# Patient Record
Sex: MALE | Race: WHITE | NOT HISPANIC OR LATINO
[De-identification: names, ages, dates, MRNs, and addresses within clinical notes are randomized per-mention and may not be internally consistent; named-entity substitution may affect disease eponyms.]

---

## 2020-02-27 PROBLEM — Z00.00 ENCOUNTER FOR PREVENTIVE HEALTH EXAMINATION: Status: ACTIVE | Noted: 2020-02-27

## 2020-03-06 ENCOUNTER — LABORATORY RESULT (OUTPATIENT)
Age: 35
End: 2020-03-06

## 2020-03-06 ENCOUNTER — APPOINTMENT (OUTPATIENT)
Dept: ENDOCRINOLOGY | Facility: CLINIC | Age: 35
End: 2020-03-06
Payer: COMMERCIAL

## 2020-03-06 VITALS
WEIGHT: 162 LBS | BODY MASS INDEX: 23.19 KG/M2 | HEIGHT: 70 IN | SYSTOLIC BLOOD PRESSURE: 98 MMHG | DIASTOLIC BLOOD PRESSURE: 62 MMHG

## 2020-03-06 DIAGNOSIS — F17.210 NICOTINE DEPENDENCE, CIGARETTES, UNCOMPLICATED: ICD-10-CM

## 2020-03-06 LAB — GLUCOSE BLDC GLUCOMTR-MCNC: 131

## 2020-03-06 PROCEDURE — 99214 OFFICE O/P EST MOD 30 MIN: CPT | Mod: 25

## 2020-03-06 PROCEDURE — 82962 GLUCOSE BLOOD TEST: CPT

## 2020-03-06 RX ORDER — BLOOD SUGAR DIAGNOSTIC
STRIP MISCELLANEOUS
Qty: 4 | Refills: 4 | Status: DISCONTINUED | COMMUNITY
Start: 2020-03-06 | End: 2020-03-06

## 2020-03-06 NOTE — PHYSICAL EXAM
[Alert] : alert [No Acute Distress] : no acute distress [Well Nourished] : well nourished [Well Developed] : well developed [Normal Sclera/Conjunctiva] : normal sclera/conjunctiva [EOMI] : extra ocular movement intact [Supple] : the neck was supple [No LAD] : no lymphadenopathy [Thyroid Not Enlarged] : the thyroid was not enlarged [Normal Rate and Effort] : normal respiratory rhythm and effort [No Accessory Muscle Use] : no accessory muscle use [Clear to Auscultation] : lungs were clear to auscultation bilaterally [Normal Rate] : heart rate was normal  [Normal S1, S2] : normal S1 and S2 [Regular Rhythm] : with a regular rhythm [Pedal Pulses Normal] : the pedal pulses are present [No Edema] : there was no peripheral edema [Normal Bowel Sounds] : normal bowel sounds [Not Tender] : non-tender [Soft] : abdomen soft [Normal Gait] : normal gait [No Rash] : no rash [Right Foot Was Examined] : right foot ~C was examined [Left Foot Was Examined] : left foot ~C was examined [Normal] : normal [Full ROM] : with full range of motion [No Motor Deficits] : the motor exam was normal [No Tremors] : no tremors [Oriented x3] : oriented to person, place, and time [Normal Insight/Judgement] : insight and judgment were intact [Normal Mood] : the mood was normal [Foot Ulcers] : no foot ulcers [Acanthosis Nigricans] : no acanthosis nigricans [Diminished Throughout Both Feet] : normal tactile sensation with monofilament testing throughout both feet

## 2020-03-06 NOTE — HISTORY OF PRESENT ILLNESS
[FreeTextEntry1] : Last visit was in 6/2017\par Current Smoker\par \par Quality: Type 1 DM \par Severity: moderate \par Duration: since the age of 7 \par Onset: polyuria, polydipsia \par Modifying Factors: Better with insulin \par Associated Symptoms: retinopathy, neuropathy \par \par Current Regimen:\par Humalog 15 units before meals\par Lantus 32 units at HS\par \par \par Self blood sugar monitoring: none\par \par  \par exercise: active at work - midnight shifts \par \par Diet: balanced \par \par Weight: possible weight loss \par \par Date of last eye exam: 2 months ago (+) DR \par Date of last foot exam: none\par Date of last flu vaccine: no\par Date of last Pneumovax: no

## 2020-03-06 NOTE — REVIEW OF SYSTEMS
[Fatigue] : fatigue [Recent Weight Loss (___ Lbs)] : recent [unfilled] ~Ulb weight loss [Visual Field Defect] : visual field defect [Blurry Vision] : blurred vision [Headache] : headaches [Polydipsia] : polydipsia [Decreased Appetite] : appetite not decreased [Recent Weight Gain (___ Lbs)] : no recent weight gain [Dysphagia] : no dysphagia [Dysphonia] : no dysphonia [Neck Pain] : no neck pain [Chest Pain] : no chest pain [Palpitations] : no palpitations [Constipation] : no constipation [Diarrhea] : no diarrhea [Polyuria] : no polyuria [Dysuria] : no dysuria [Tremors] : no tremors [Depression] : no depression [Anxiety] : no anxiety [Cold Intolerance] : cold tolerant [Heat Intolerance] : heat tolerant [Easy Bruising] : no tendency for easy bruising [Swelling] : no swelling [de-identified] : r/t stress and lack of sleep  [de-identified] : with high blood sugars

## 2020-03-13 LAB
25(OH)D3 SERPL-MCNC: 29.1 NG/ML
ALBUMIN SERPL ELPH-MCNC: 5.1 G/DL
ALP BLD-CCNC: 138 U/L
ALT SERPL-CCNC: 22 U/L
ANION GAP SERPL CALC-SCNC: 14 MMOL/L
AST SERPL-CCNC: 20 U/L
BASOPHILS # BLD AUTO: 0.05 K/UL
BASOPHILS NFR BLD AUTO: 0.7 %
BILIRUB SERPL-MCNC: 0.4 MG/DL
BUN SERPL-MCNC: 11 MG/DL
C PEPTIDE SERPL-MCNC: <0.1 NG/ML
CALCIUM SERPL-MCNC: 10.5 MG/DL
CHLORIDE SERPL-SCNC: 99 MMOL/L
CHOLEST SERPL-MCNC: 195 MG/DL
CHOLEST/HDLC SERPL: 4.1 RATIO
CO2 SERPL-SCNC: 28 MMOL/L
CREAT SERPL-MCNC: 0.71 MG/DL
CREAT SPEC-SCNC: 113 MG/DL
EOSINOPHIL # BLD AUTO: 0.16 K/UL
EOSINOPHIL NFR BLD AUTO: 2.3 %
ESTIMATED AVERAGE GLUCOSE: 286 MG/DL
GAD65 AB SER-MCNC: 0.04 NMOL/L
GLUCOSE SERPL-MCNC: 68 MG/DL
HBA1C MFR BLD HPLC: 11.6 %
HCT VFR BLD CALC: 56.2 %
HDLC SERPL-MCNC: 48 MG/DL
HGB BLD-MCNC: 18.3 G/DL
IMM GRANULOCYTES NFR BLD AUTO: 0.1 %
LDLC SERPL CALC-MCNC: 118 MG/DL
LYMPHOCYTES # BLD AUTO: 2.02 K/UL
LYMPHOCYTES NFR BLD AUTO: 28.5 %
MAN DIFF?: NORMAL
MCHC RBC-ENTMCNC: 30.6 PG
MCHC RBC-ENTMCNC: 32.6 GM/DL
MCV RBC AUTO: 93.8 FL
MICROALBUMIN 24H UR DL<=1MG/L-MCNC: 36.5 MG/DL
MICROALBUMIN/CREAT 24H UR-RTO: 323 MG/G
MONOCYTES # BLD AUTO: 0.55 K/UL
MONOCYTES NFR BLD AUTO: 7.7 %
NEUTROPHILS # BLD AUTO: 4.31 K/UL
NEUTROPHILS NFR BLD AUTO: 60.7 %
PLATELET # BLD AUTO: 284 K/UL
POTASSIUM SERPL-SCNC: 4.4 MMOL/L
PROT SERPL-MCNC: 7.4 G/DL
RBC # BLD: 5.99 M/UL
RBC # FLD: 12.6 %
SODIUM SERPL-SCNC: 142 MMOL/L
T3FREE SERPL-MCNC: 3.29 PG/ML
T4 FREE SERPL-MCNC: 1.2 NG/DL
TRIGL SERPL-MCNC: 146 MG/DL
TSH SERPL-ACNC: 2.06 UIU/ML
VIT B12 SERPL-MCNC: 1090 PG/ML
WBC # FLD AUTO: 7.1 K/UL

## 2020-03-23 ENCOUNTER — APPOINTMENT (OUTPATIENT)
Dept: ENDOCRINOLOGY | Facility: CLINIC | Age: 35
End: 2020-03-23

## 2020-04-14 ENCOUNTER — APPOINTMENT (OUTPATIENT)
Dept: ENDOCRINOLOGY | Facility: CLINIC | Age: 35
End: 2020-04-14

## 2020-06-01 ENCOUNTER — APPOINTMENT (OUTPATIENT)
Dept: ENDOCRINOLOGY | Facility: CLINIC | Age: 35
End: 2020-06-01

## 2021-02-10 ENCOUNTER — APPOINTMENT (OUTPATIENT)
Dept: ENDOCRINOLOGY | Facility: CLINIC | Age: 36
End: 2021-02-10
Payer: COMMERCIAL

## 2021-02-10 VITALS
HEART RATE: 103 BPM | OXYGEN SATURATION: 5 % | WEIGHT: 157 LBS | HEIGHT: 70 IN | SYSTOLIC BLOOD PRESSURE: 120 MMHG | DIASTOLIC BLOOD PRESSURE: 80 MMHG | BODY MASS INDEX: 22.48 KG/M2

## 2021-02-10 LAB — GLUCOSE BLDC GLUCOMTR-MCNC: 176

## 2021-02-10 PROCEDURE — 99072 ADDL SUPL MATRL&STAF TM PHE: CPT

## 2021-02-10 PROCEDURE — 99214 OFFICE O/P EST MOD 30 MIN: CPT | Mod: 25

## 2021-02-10 PROCEDURE — 82962 GLUCOSE BLOOD TEST: CPT

## 2021-02-10 RX ORDER — BLOOD-GLUCOSE METER
W/DEVICE KIT MISCELLANEOUS
Qty: 1 | Refills: 0 | Status: DISCONTINUED | COMMUNITY
Start: 2020-03-06 | End: 2021-02-10

## 2021-02-10 RX ORDER — BLOOD-GLUCOSE,RECEIVER,CONT
EACH MISCELLANEOUS
Qty: 1 | Refills: 0 | Status: DISCONTINUED | COMMUNITY
Start: 2020-03-06 | End: 2021-02-10

## 2021-02-10 RX ORDER — BLOOD-GLUCOSE SENSOR
EACH MISCELLANEOUS
Qty: 1 | Refills: 11 | Status: DISCONTINUED | COMMUNITY
Start: 2020-03-06 | End: 2021-02-10

## 2021-02-10 RX ORDER — BLOOD-GLUCOSE TRANSMITTER
EACH MISCELLANEOUS
Qty: 1 | Refills: 3 | Status: DISCONTINUED | COMMUNITY
Start: 2020-03-06 | End: 2021-02-10

## 2021-02-10 NOTE — HISTORY OF PRESENT ILLNESS
[FreeTextEntry1] : Last visit was almost 1 year ago and before that was in 2017 \par \par Quality: Type 1 DM \par Severity: moderate \par Duration: since the age of 7 \par Onset: polyuria, polydipsia \par Modifying Factors: Better with insulin \par Associated Symptoms: retinopathy, neuropathy \par \par Current Regimen:\par Humalog 10-20 units before meals\par Lantus 32 units at HS\par \par \par Self blood sugar monitoring: none\par was using Kelsi, he ran out of sensors\par Current \par  \par exercise: active at work - midnight shifts \par \par Diet: 3-4 meals a day, once awhile snack before dinner \par \par Weight: 5 pounds weight loss \par \par Date of last eye exam: over 1 year (+) DR \par Date of last foot exam: none\par Date of last flu vaccine: no\par Date of last Pneumovax: no \par \par Using vape cigarettes and Marijuana

## 2021-02-10 NOTE — REVIEW OF SYSTEMS
[Recent Weight Loss (___ Lbs)] : recent weight loss: [unfilled] lbs [Headaches] : headaches [Fatigue] : no fatigue [Decreased Appetite] : appetite not decreased [Visual Field Defect] : no visual field defect [Blurred Vision] : no blurred vision [Dysphagia] : no dysphagia [Neck Pain] : no neck pain [Dysphonia] : no dysphonia [Chest Pain] : no chest pain [Palpitations] : no palpitations [Constipation] : no constipation [Diarrhea] : no diarrhea [Polyuria] : no polyuria [Dysuria] : no dysuria [Tremors] : no tremors [Depression] : no depression [Anxiety] : no anxiety [Polydipsia] : no polydipsia [Swelling] : no swelling [FreeTextEntry3] : hard to see at night  [de-identified] : hx of headaches

## 2021-02-10 NOTE — ASSESSMENT
[FreeTextEntry1] : 34 y/o current vaping male with Type 1 DM. No recent labs. \par \par Plan: \par Type 1 DM:  Glycemic control is unknown due to no blood sugar testing or recent A1C\par - check A1C now \par - continue current insulin regimen\par - ordered Freestyle Kelsi sensors\par - discussed the use the Tslim insulin pump\par - stressed the importance of testing blood sugars 4 times a day and keeping follow up appointments \par - follow up with podiatry Dr. Chavez information given \par - reviewed the importance of follow up with ophthalmology due to retinopathy \par \par Current Smoker: educated on the importance of smoking cessation including risks of smoking. Encouraged pt. to stop smoking. Pt. not ready to stop vaping. \par \par RTO in 4 weeks. \par  [Smoking Cessation] : smoking cessation

## 2021-02-10 NOTE — PHYSICAL EXAM
[Alert] : alert [Well Nourished] : well nourished [No Acute Distress] : no acute distress [Well Developed] : well developed [Normal Sclera/Conjunctiva] : normal sclera/conjunctiva [EOMI] : extra ocular movement intact [No LAD] : no lymphadenopathy [Thyroid Not Enlarged] : the thyroid was not enlarged [No Thyroid Nodules] : no palpable thyroid nodules [No Respiratory Distress] : no respiratory distress [No Accessory Muscle Use] : no accessory muscle use [Normal Rate and Effort] : normal respiratory rate and effort [Clear to Auscultation] : lungs were clear to auscultation bilaterally [No Edema] : no peripheral edema [Pedal Pulses Normal] : the pedal pulses are present [Normal Bowel Sounds] : normal bowel sounds [Not Tender] : non-tender [Soft] : abdomen soft [Normal Gait] : normal gait [No Stigmata of Cushings Syndrome] : no stigmata of Cushings Syndrome [Acanthosis Nigricans] : no acanthosis nigricans [Foot Ulcers] : no foot ulcers [Right Foot Was Examined] : right foot ~C was examined [Left Foot Was Examined] : left foot ~C was examined [Normal] : normal [Diminished Throughout Both Feet] : normal tactile sensation with monofilament testing throughout both feet [No Tremors] : no tremors [Oriented x3] : oriented to person, place, and time [Normal Affect] : the affect was normal [Normal Insight/Judgement] : insight and judgment were intact [Normal Mood] : the mood was normal [FreeTextEntry1] : inner foot with redness from previous blister  [FreeTextEntry5] : inner foot with redness from previous blister

## 2021-02-14 ENCOUNTER — NON-APPOINTMENT (OUTPATIENT)
Age: 36
End: 2021-02-14

## 2021-02-14 LAB
25(OH)D3 SERPL-MCNC: 33.2 NG/ML
ALBUMIN SERPL ELPH-MCNC: 4.7 G/DL
ALP BLD-CCNC: 129 U/L
ALT SERPL-CCNC: 22 U/L
ANION GAP SERPL CALC-SCNC: 13 MMOL/L
AST SERPL-CCNC: 20 U/L
BASOPHILS # BLD AUTO: 0.06 K/UL
BASOPHILS NFR BLD AUTO: 1.1 %
BILIRUB SERPL-MCNC: 0.6 MG/DL
BUN SERPL-MCNC: 15 MG/DL
CALCIUM SERPL-MCNC: 10.2 MG/DL
CHLORIDE SERPL-SCNC: 100 MMOL/L
CHOLEST SERPL-MCNC: 190 MG/DL
CO2 SERPL-SCNC: 27 MMOL/L
CREAT SERPL-MCNC: 0.82 MG/DL
CREAT SPEC-SCNC: 247 MG/DL
EOSINOPHIL # BLD AUTO: 0.17 K/UL
EOSINOPHIL NFR BLD AUTO: 3.2 %
ESTIMATED AVERAGE GLUCOSE: 301 MG/DL
GLUCOSE SERPL-MCNC: 178 MG/DL
HBA1C MFR BLD HPLC: 12.1 %
HCT VFR BLD CALC: 51.9 %
HDLC SERPL-MCNC: 50 MG/DL
HGB BLD-MCNC: 17.7 G/DL
IMM GRANULOCYTES NFR BLD AUTO: 0.4 %
LDLC SERPL CALC-MCNC: 122 MG/DL
LYMPHOCYTES # BLD AUTO: 1.51 K/UL
LYMPHOCYTES NFR BLD AUTO: 28.2 %
MAN DIFF?: NORMAL
MCHC RBC-ENTMCNC: 30.7 PG
MCHC RBC-ENTMCNC: 34.1 GM/DL
MCV RBC AUTO: 90.1 FL
MICROALBUMIN 24H UR DL<=1MG/L-MCNC: 209.5 MG/DL
MICROALBUMIN/CREAT 24H UR-RTO: 850 MG/G
MONOCYTES # BLD AUTO: 0.5 K/UL
MONOCYTES NFR BLD AUTO: 9.3 %
NEUTROPHILS # BLD AUTO: 3.1 K/UL
NEUTROPHILS NFR BLD AUTO: 57.8 %
NONHDLC SERPL-MCNC: 140 MG/DL
PLATELET # BLD AUTO: 263 K/UL
POTASSIUM SERPL-SCNC: 4.4 MMOL/L
PROT SERPL-MCNC: 7 G/DL
RBC # BLD: 5.76 M/UL
RBC # FLD: 12.4 %
SODIUM SERPL-SCNC: 140 MMOL/L
T4 FREE SERPL-MCNC: 1.3 NG/DL
TRIGL SERPL-MCNC: 87 MG/DL
TSH SERPL-ACNC: 1.72 UIU/ML
VIT B12 SERPL-MCNC: 1084 PG/ML
WBC # FLD AUTO: 5.36 K/UL

## 2021-03-08 ENCOUNTER — APPOINTMENT (OUTPATIENT)
Dept: ENDOCRINOLOGY | Facility: CLINIC | Age: 36
End: 2021-03-08

## 2021-06-14 ENCOUNTER — APPOINTMENT (OUTPATIENT)
Dept: ENDOCRINOLOGY | Facility: CLINIC | Age: 36
End: 2021-06-14
Payer: COMMERCIAL

## 2021-06-14 VITALS
WEIGHT: 155 LBS | SYSTOLIC BLOOD PRESSURE: 124 MMHG | HEIGHT: 70 IN | BODY MASS INDEX: 22.19 KG/M2 | HEART RATE: 89 BPM | DIASTOLIC BLOOD PRESSURE: 82 MMHG

## 2021-06-14 DIAGNOSIS — F17.290 NICOTINE DEPENDENCE, OTHER TOBACCO PRODUCT, UNCOMPLICATED: ICD-10-CM

## 2021-06-14 LAB — GLUCOSE BLDC GLUCOMTR-MCNC: 144

## 2021-06-14 PROCEDURE — 36415 COLL VENOUS BLD VENIPUNCTURE: CPT

## 2021-06-14 PROCEDURE — 99072 ADDL SUPL MATRL&STAF TM PHE: CPT

## 2021-06-14 PROCEDURE — 99214 OFFICE O/P EST MOD 30 MIN: CPT | Mod: 25

## 2021-06-14 PROCEDURE — 82962 GLUCOSE BLOOD TEST: CPT

## 2021-06-14 PROCEDURE — 99406 BEHAV CHNG SMOKING 3-10 MIN: CPT

## 2021-06-14 NOTE — REVIEW OF SYSTEMS
[Fatigue] : fatigue [Visual Field Defect] : visual field defect [Headaches] : headaches [Decreased Appetite] : appetite not decreased [Recent Weight Gain (___ Lbs)] : no recent weight gain [Recent Weight Loss (___ Lbs)] : no recent weight loss [Blurred Vision] : no blurred vision [Dysphagia] : no dysphagia [Neck Pain] : no neck pain [Dysphonia] : no dysphonia [Chest Pain] : no chest pain [Palpitations] : no palpitations [Constipation] : no constipation [Diarrhea] : no diarrhea [Polyuria] : no polyuria [Dysuria] : no dysuria [Tremors] : no tremors [Depression] : no depression [Anxiety] : no anxiety [Polydipsia] : no polydipsia [FreeTextEntry2] : weight stable  [FreeTextEntry3] : changes at night

## 2021-06-14 NOTE — HISTORY OF PRESENT ILLNESS
[FreeTextEntry1] : Quality: Type 1 DM \par Severity: moderate \par Duration: since the age of 7 \par Onset: polyuria, polydipsia \par Modifying Factors: Better with insulin \par Associated Symptoms: retinopathy, neuropathy \par \par Current Regimen:\par Humalog 10 units before meals\par Lantus 38 units at HS\par \par \par Self blood sugar monitoring: randomly\par pt. reporting erratic blood sugars - with hypoglycemic symptoms he does not check blood sugar instead corrects with carbs, then will have high blood sugars. Pt. reports he has hypoglycemic symptoms with blood sugars <100. \par was using Kelsi, he ran out of sensors\par Current \par  \par exercise: active at work - midnight shifts \par \par Diet: 3-4 meals a day, once awhile snack before dinner \par \par Weight: stable \par \par Date of last eye exam: over 1 year (+) DR \par Date of last foot exam: none, has appointment with podiatry in a few months \par Date of last flu vaccine: no\par Date of last Pneumovax: no \par \par Using vape cigarettes and Marijuana

## 2021-06-14 NOTE — ASSESSMENT
[FreeTextEntry1] : 36 y/o current vaping male with Type 1 DM. No recent labs. \par \par Plan: \par Type 1 DM:  Glycemic control is unknown due to no blood sugar testing or recent A1C\par - check A1C now \par - continue current insulin regimen\par - ordered Freestyle Kelsi sensors\par - reviewed Dexcom sensor\par Glucose Sensor Necessity: This patient with diabetes performs glucose checks per day utilizing a continuous blood glucose monitor. The patient is treated with insulin via 3 or more injections daily. This patient requires frequent adjustments to their insulin treatment on the basis of therapeutic continuous glucose monitoring results. In addition, the patient has been to our office for an evaluation of their diabetes control within the past 6 months.\par \par - discussed the use the Tslim insulin pump\par - stressed the importance of testing blood sugars 4 times a day and keeping follow up appointments \par - keep appointment with podiatry Dr. Chavez \par - reviewed the importance of follow up with ophthalmology due for retinopathy screening  \par \par Current Smoker: educated on the importance of smoking cessation including risks of smoking. Encouraged pt. to stop smoking. Pt. not ready to stop vaping. \par \par RTO in 4 weeks. \par  [Diabetes Foot Care] : diabetes foot care [Retinopathy Screening] : Patient was referred to ophthalmology for retinopathy screening [Smoking Cessation] : smoking cessation

## 2021-06-14 NOTE — PHYSICAL EXAM
[Alert] : alert [Well Nourished] : well nourished [No Acute Distress] : no acute distress [Well Developed] : well developed [Normal Sclera/Conjunctiva] : normal sclera/conjunctiva [EOMI] : extra ocular movement intact [No LAD] : no lymphadenopathy [Thyroid Not Enlarged] : the thyroid was not enlarged [No Thyroid Nodules] : no palpable thyroid nodules [No Respiratory Distress] : no respiratory distress [No Accessory Muscle Use] : no accessory muscle use [Normal Rate and Effort] : normal respiratory rate and effort [Clear to Auscultation] : lungs were clear to auscultation bilaterally [No Edema] : no peripheral edema [Pedal Pulses Normal] : the pedal pulses are present [Normal Bowel Sounds] : normal bowel sounds [Not Tender] : non-tender [Soft] : abdomen soft [No Stigmata of Cushings Syndrome] : no stigmata of Cushings Syndrome [Normal Gait] : normal gait [Acanthosis Nigricans] : no acanthosis nigricans [Foot Ulcers] : no foot ulcers [Right Foot Was Examined] : right foot ~C was examined [Left Foot Was Examined] : left foot ~C was examined [Normal] : normal [Diminished Throughout Both Feet] : normal tactile sensation with monofilament testing throughout both feet [No Tremors] : no tremors [Oriented x3] : oriented to person, place, and time [Normal Affect] : the affect was normal [Normal Insight/Judgement] : insight and judgment were intact [FreeTextEntry1] : inner foot with redness from previous blister  [Normal Mood] : the mood was normal [FreeTextEntry5] : inner foot with redness from previous blister

## 2021-06-15 ENCOUNTER — NON-APPOINTMENT (OUTPATIENT)
Age: 36
End: 2021-06-15

## 2021-06-15 LAB
25(OH)D3 SERPL-MCNC: 33.6 NG/ML
ALBUMIN SERPL ELPH-MCNC: 4.3 G/DL
ALP BLD-CCNC: 108 U/L
ALT SERPL-CCNC: 16 U/L
ANION GAP SERPL CALC-SCNC: 10 MMOL/L
AST SERPL-CCNC: 17 U/L
BASOPHILS # BLD AUTO: 0.04 K/UL
BASOPHILS NFR BLD AUTO: 0.5 %
BILIRUB SERPL-MCNC: 0.4 MG/DL
BUN SERPL-MCNC: 16 MG/DL
CALCIUM SERPL-MCNC: 9.8 MG/DL
CHLORIDE SERPL-SCNC: 102 MMOL/L
CHOLEST SERPL-MCNC: 139 MG/DL
CO2 SERPL-SCNC: 30 MMOL/L
CREAT SERPL-MCNC: 0.74 MG/DL
CREAT SPEC-SCNC: 65 MG/DL
EOSINOPHIL # BLD AUTO: 0.21 K/UL
EOSINOPHIL NFR BLD AUTO: 2.4 %
ESTIMATED AVERAGE GLUCOSE: 258 MG/DL
GLUCOSE SERPL-MCNC: 166 MG/DL
HBA1C MFR BLD HPLC: 10.6 %
HCT VFR BLD CALC: 52.5 %
HDLC SERPL-MCNC: 50 MG/DL
HGB BLD-MCNC: 16.9 G/DL
IMM GRANULOCYTES NFR BLD AUTO: 0.2 %
LDLC SERPL CALC-MCNC: 75 MG/DL
LYMPHOCYTES # BLD AUTO: 1.74 K/UL
LYMPHOCYTES NFR BLD AUTO: 20 %
MAN DIFF?: NORMAL
MCHC RBC-ENTMCNC: 30.8 PG
MCHC RBC-ENTMCNC: 32.2 GM/DL
MCV RBC AUTO: 95.6 FL
MICROALBUMIN 24H UR DL<=1MG/L-MCNC: 16.3 MG/DL
MICROALBUMIN/CREAT 24H UR-RTO: 249 MG/G
MONOCYTES # BLD AUTO: 0.57 K/UL
MONOCYTES NFR BLD AUTO: 6.6 %
NEUTROPHILS # BLD AUTO: 6.1 K/UL
NEUTROPHILS NFR BLD AUTO: 70.3 %
NONHDLC SERPL-MCNC: 88 MG/DL
PLATELET # BLD AUTO: 254 K/UL
POTASSIUM SERPL-SCNC: 4.8 MMOL/L
PROT SERPL-MCNC: 6.4 G/DL
RBC # BLD: 5.49 M/UL
RBC # FLD: 13.1 %
SODIUM SERPL-SCNC: 141 MMOL/L
T4 FREE SERPL-MCNC: 1.3 NG/DL
TRIGL SERPL-MCNC: 67 MG/DL
TSH SERPL-ACNC: 2.54 UIU/ML
VIT B12 SERPL-MCNC: 806 PG/ML
WBC # FLD AUTO: 8.68 K/UL

## 2021-07-14 ENCOUNTER — APPOINTMENT (OUTPATIENT)
Dept: ENDOCRINOLOGY | Facility: CLINIC | Age: 36
End: 2021-07-14

## 2021-10-05 ENCOUNTER — APPOINTMENT (OUTPATIENT)
Dept: ENDOCRINOLOGY | Facility: CLINIC | Age: 36
End: 2021-10-05
Payer: COMMERCIAL

## 2021-10-05 PROCEDURE — 99214 OFFICE O/P EST MOD 30 MIN: CPT | Mod: 95

## 2021-10-05 RX ORDER — PEN NEEDLE, DIABETIC 29 G X1/2"
32G X 4 MM NEEDLE, DISPOSABLE MISCELLANEOUS
Qty: 360 | Refills: 1 | Status: DISCONTINUED | COMMUNITY
Start: 2020-03-16 | End: 2021-10-05

## 2021-10-05 NOTE — HISTORY OF PRESENT ILLNESS
[Other Location: e.g. School (Enter Location, City,State)___] : at [unfilled], at the time of the visit. [Other Location: e.g. Home (Enter Location, City,State)___] : at [unfilled] [Verbal consent obtained from patient] : the patient, [unfilled] [FreeTextEntry1] : Quality: Type 1 DM \par Severity: moderate \par Duration: since the age of 7 \par Onset: polyuria, polydipsia \par Modifying Factors: Better with insulin \par Associated Symptoms: retinopathy, neuropathy \par \par Current Regimen:\par Humalog, trying count carbs using carb ratio 1:30 \par Lantus 35 units at HS\par \par \par Self blood sugar monitoring: Kelsi sensor - phone use, \par pt. reporting erratic blood sugars - with hypoglycemic symptoms he does not check blood sugar instead corrects with carbs, then will have high blood sugars. Pt. reports he has hypoglycemic symptoms with blood sugars <100.\par Pt. does not always bolus before meals, "I like to see how much I am actually eating first" \par Before breakfast \par  \par exercise: active at work - midnight shifts \par \par Diet: 3-4 small meals a day, once awhile snack before dinner \par \par Weight: stable \par \par Date of last eye exam: over 1 year (+) DR \par Date of last foot exam: none, needs to see podiatry, + neuropathy anterior of feet  \par Date of last flu vaccine: no\par Date of last Pneumovax: no \par \par Using vape cigarettes and Marijuana

## 2021-10-05 NOTE — REVIEW OF SYSTEMS
[Blurred Vision] : blurred vision [Headaches] : headaches [Fatigue] : no fatigue [Decreased Appetite] : appetite not decreased [Recent Weight Gain (___ Lbs)] : no recent weight gain [Recent Weight Loss (___ Lbs)] : no recent weight loss [Visual Field Defect] : no visual field defect [Dysphagia] : no dysphagia [Neck Pain] : no neck pain [Dysphonia] : no dysphonia [Chest Pain] : no chest pain [Palpitations] : no palpitations [Constipation] : no constipation [Diarrhea] : no diarrhea [Polyuria] : no polyuria [Dysuria] : no dysuria [Tremors] : no tremors [Depression] : no depression [Anxiety] : no anxiety [Polydipsia] : no polydipsia [FreeTextEntry2] : weight stable  [FreeTextEntry3] : poor vision at night  [de-identified] : h

## 2021-10-05 NOTE — ASSESSMENT
[FreeTextEntry1] : 37 y/o current vaping male with Type 1 DM. No recent labs. \par \par Plan: \par Type 1 DM:  Glycemic control is unknown due to no blood sugar testing or recent A1C\par - check A1C now \par - continue current insulin regimen for now, consider changing insulin to Fiasp to improve post prandial high glucose, pt. will look into insurance coverage \par - continue Freestyle Kelsi sensor\par Glucose Sensor Necessity: This patient with diabetes performs glucose checks per day utilizing a continuous blood glucose monitor. The patient is treated with insulin via 3 or more injections daily. This patient requires frequent adjustments to their insulin treatment on the basis of therapeutic continuous glucose monitoring results. In addition, the patient has been to our office for an evaluation of their diabetes control within the past 6 months.\par \par - discussed the use the Tslim insulin pump\par - stressed the importance of testing blood sugars 4 times a day and keeping follow up appointments \par - keep appointment with podiatry Dr. Chvaez \par - reviewed the importance of follow up with ophthalmology due for retinopathy screening  \par \par Current Smoker: educated on the importance of smoking cessation including risks of smoking. Encouraged pt. to stop smoking. Pt. not ready to stop vaping. \par \par RTO in 6 weeks. \par \par Stat Time: 9:28\par End Time: 10:00\par

## 2021-10-07 ENCOUNTER — NON-APPOINTMENT (OUTPATIENT)
Age: 36
End: 2021-10-07

## 2022-04-25 ENCOUNTER — APPOINTMENT (OUTPATIENT)
Dept: ENDOCRINOLOGY | Facility: CLINIC | Age: 37
End: 2022-04-25
Payer: COMMERCIAL

## 2022-04-25 ENCOUNTER — RESULT CHARGE (OUTPATIENT)
Age: 37
End: 2022-04-25

## 2022-04-25 VITALS
HEART RATE: 87 BPM | BODY MASS INDEX: 22.19 KG/M2 | OXYGEN SATURATION: 98 % | SYSTOLIC BLOOD PRESSURE: 130 MMHG | HEIGHT: 70 IN | DIASTOLIC BLOOD PRESSURE: 90 MMHG | WEIGHT: 155 LBS

## 2022-04-25 LAB — GLUCOSE BLDC GLUCOMTR-MCNC: 263

## 2022-04-25 PROCEDURE — 82962 GLUCOSE BLOOD TEST: CPT

## 2022-04-25 PROCEDURE — 99214 OFFICE O/P EST MOD 30 MIN: CPT | Mod: 25

## 2022-04-25 RX ORDER — FLASH GLUCOSE SENSOR
KIT MISCELLANEOUS
Qty: 6 | Refills: 1 | Status: DISCONTINUED | COMMUNITY
Start: 2021-02-10 | End: 2022-04-25

## 2022-04-25 NOTE — REVIEW OF SYSTEMS
[Visual Field Defect] : visual field defect [Blurred Vision] : blurred vision [Diarrhea] : diarrhea [Fatigue] : no fatigue [Recent Weight Gain (___ Lbs)] : no recent weight gain [Recent Weight Loss (___ Lbs)] : no recent weight loss [Dysphagia] : no dysphagia [Neck Pain] : no neck pain [Dysphonia] : no dysphonia [Chest Pain] : no chest pain [Shortness Of Breath] : no shortness of breath [Constipation] : no constipation [Polyuria] : no polyuria [Nocturia] : no nocturia [Polydipsia] : no polydipsia [FreeTextEntry9] : +feet pain

## 2022-04-25 NOTE — ASSESSMENT
[FreeTextEntry1] : T1DM\par -Glycemic control is unknown due to no blood sugar testing or recent A1C\par - fasting labs due ASAP \par - continue current insulin regimen for now, pt needs to establish a more concrete Humalog dosing- begin with ICR 1:10\par - continue Freestyle Kelsi sensor, renewed sensors- upgrade to libre2- have wife download librelink analisa \par Glucose Sensor Necessity: This patient with diabetes performs glucose checks per day utilizing a continuous blood glucose monitor. The patient is treated with insulin via 3 or more injections daily. This patient requires frequent adjustments to their insulin treatment on the basis of therapeutic continuous glucose monitoring results. In addition, the patient has been to our office for an evaluation of their diabetes control within the past 6 months.\par \par - discussed the use the Tslim and Omnipod insulin pump- this is a goal for pt but understands he cannot go on a pump until he begins to carb cont appropriately \par - stressed the importance of testing blood sugars 4 times a day and keeping follow up appointments \par - make appointment with podiatry Dr. Chavez \par - reviewed the importance of follow up with ophthalmology due for retinopathy screening \par \par RTO CDE for pump interest and carb counting, 3 months DP

## 2022-04-25 NOTE — HISTORY OF PRESENT ILLNESS
[FreeTextEntry1] : Quality: Type 1 DM \par Severity: moderate \par Duration: since the age of 7 \par Onset: polyuria, polydipsia \par Modifying Factors: Better with insulin \par Associated Symptoms: retinopathy, neuropathy \par \par SMBG\par Has used libres in the past- has no sensor on at this time\par Has not been checking blood sugars reguarly \par In office 263- had waffles for breakfast\par Endorses symptoms of hypoglycemia \par \par Current drug regimen\par Humalog, on average sandwhich 10-15 units pre meal- no rhyme or reason - usually 80 units a day in divided doses, does not carb count well  , takes after he eats\par Lantus 35 units at HS\par \par Eye exam: has not gone in many years- +vision changes- states he hasn’t been in 5 years\par Foot exam: +neuropathy, cuts own toe nails\par Kidney disease: denies\par Heart disease: denies \par \par Weight: stable \par Diet: does not eat whole wheat\par small bfast, small lunch\par +gatorade\par Exercise: HVAC for work \par Smoking +vapes, smokes marijuana

## 2022-05-17 ENCOUNTER — APPOINTMENT (OUTPATIENT)
Dept: ENDOCRINOLOGY | Facility: CLINIC | Age: 37
End: 2022-05-17
Payer: COMMERCIAL

## 2022-05-17 PROCEDURE — G0108 DIAB MANAGE TRN  PER INDIV: CPT

## 2022-06-15 ENCOUNTER — APPOINTMENT (OUTPATIENT)
Dept: ENDOCRINOLOGY | Facility: CLINIC | Age: 37
End: 2022-06-15

## 2022-07-28 ENCOUNTER — APPOINTMENT (OUTPATIENT)
Dept: ENDOCRINOLOGY | Facility: CLINIC | Age: 37
End: 2022-07-28

## 2022-10-28 ENCOUNTER — APPOINTMENT (OUTPATIENT)
Dept: ENDOCRINOLOGY | Facility: CLINIC | Age: 37
End: 2022-10-28

## 2022-10-28 ENCOUNTER — TRANSCRIPTION ENCOUNTER (OUTPATIENT)
Age: 37
End: 2022-10-28

## 2022-10-28 VITALS
SYSTOLIC BLOOD PRESSURE: 132 MMHG | HEIGHT: 70 IN | HEART RATE: 88 BPM | OXYGEN SATURATION: 97 % | WEIGHT: 158 LBS | DIASTOLIC BLOOD PRESSURE: 82 MMHG | BODY MASS INDEX: 22.62 KG/M2

## 2022-10-28 DIAGNOSIS — Z79.899 OTHER LONG TERM (CURRENT) DRUG THERAPY: ICD-10-CM

## 2022-10-28 DIAGNOSIS — N63.10 UNSPECIFIED LUMP IN THE RIGHT BREAST, UNSPECIFIED QUADRANT: ICD-10-CM

## 2022-10-28 LAB — GLUCOSE BLDC GLUCOMTR-MCNC: 196

## 2022-10-28 PROCEDURE — 99214 OFFICE O/P EST MOD 30 MIN: CPT | Mod: 25

## 2022-10-28 PROCEDURE — 82962 GLUCOSE BLOOD TEST: CPT

## 2022-10-28 RX ORDER — AMOXICILLIN AND CLAVULANATE POTASSIUM 875; 125 MG/1; MG/1
875-125 TABLET, COATED ORAL
Qty: 20 | Refills: 0 | Status: DISCONTINUED | COMMUNITY
Start: 2022-07-17

## 2022-10-28 RX ORDER — BLOOD SUGAR DIAGNOSTIC
STRIP MISCELLANEOUS 4 TIMES DAILY
Qty: 200 | Refills: 1 | Status: ACTIVE | COMMUNITY
Start: 2021-02-10 | End: 1900-01-01

## 2022-10-28 RX ORDER — CIPROFLOXACIN AND DEXAMETHASONE 3; 1 MG/ML; MG/ML
0.3-0.1 SUSPENSION/ DROPS AURICULAR (OTIC)
Qty: 8 | Refills: 0 | Status: DISCONTINUED | COMMUNITY
Start: 2022-07-17

## 2022-10-28 RX ORDER — BLOOD SUGAR DIAGNOSTIC
STRIP MISCELLANEOUS 4 TIMES DAILY
Qty: 4 | Refills: 0 | Status: DISCONTINUED | COMMUNITY
Start: 2020-03-06 | End: 2022-10-28

## 2022-10-28 NOTE — HISTORY OF PRESENT ILLNESS
[FreeTextEntry1] : C/o right breast lump under nipple. He reports ED. \par \par Quality: Type 1 DM \par Severity: moderate \par Duration: since the age of 7 \par Onset: polyuria, polydipsia \par Modifying Factors: Better with insulin \par Associated Symptoms: retinopathy, neuropathy \par \par SMBG: Freestyle Kelsi - stopped using due to falling off, does not check blood sugars regularly, denies low blood sugars\par In office 196\par Endorses symptoms of hypoglycemia \par \par Current drug regimen\par Humalog IC ratio 15, ISF 30\par Lantus 35 units at HS\par \par Eye exam: has not gone in many years- +vision changes- states he hasn’t been in 5 years\par Foot exam: +neuropathy, cuts own toe nails\par Kidney disease: denies\par Heart disease: denies \par \par Weight: stable \par Diet: does not eat whole wheat\par small bfast, small lunch\par +gatorade\par Exercise: HVAC for work \par Smoking +vapes, smokes marijuana

## 2022-10-28 NOTE — ASSESSMENT
[FreeTextEntry1] : T1DM\par -Glycemic control is unknown due to no blood sugar testing or recent A1C\par - fasting labs due ASAP \par - continue current insulin regimen for now, pt needs to establish a more concrete Humalog dosing- continue ICR 1:15\par - Start Freestyle Libre3 sensor \par Glucose Sensor Necessity: This patient with diabetes performs glucose checks per day utilizing a continuous blood glucose monitor. The patient is treated with insulin via 3 or more injections daily. This patient requires frequent adjustments to their insulin treatment on the basis of therapeutic continuous glucose monitoring results. In addition, the patient has been to our office for an evaluation of their diabetes control within the past 6 months.\par \par - discussed the use the Tslim and Omnipod insulin pump- this is a goal for pt but understands he cannot go on a pump until he begins to carb cont appropriately \par - stressed the importance of testing blood sugars 4 times a day and keeping follow up appointments \par - make appointment with podiatry Dr. Chavez \par - reviewed the importance of follow up with ophthalmology due for retinopathy screening \par \par Right breast lump: referral given for breast sonogram \par \par ED: r/o low testosterone, see urology, patient desires Viagra in the future\par \par HTN: start Lisinopril for BP control and renal protection\par \par RTO in 4-6 weeks with NP\par RTO in 4 months with Dr. Ricci  [Retinopathy Screening] : Patient was referred to ophthalmology for retinopathy screening

## 2022-10-28 NOTE — REVIEW OF SYSTEMS
[Fatigue] : fatigue [Blurred Vision] : blurred vision [Headaches] : headaches [Decreased Appetite] : appetite not decreased [Recent Weight Gain (___ Lbs)] : no recent weight gain [Recent Weight Loss (___ Lbs)] : no recent weight loss [Visual Field Defect] : no visual field defect [Dysphagia] : no dysphagia [Neck Pain] : no neck pain [Dysphonia] : no dysphonia [Chest Pain] : no chest pain [Palpitations] : no palpitations [Constipation] : no constipation [Diarrhea] : no diarrhea [Polyuria] : no polyuria [Dysuria] : no dysuria [Erectile Dysfunction] : erectile dysfunction [Tremors] : no tremors [Depression] : no depression [Anxiety] : no anxiety [Polydipsia] : no polydipsia [Swelling] : no swelling [FreeTextEntry2] : weight stable

## 2022-10-28 NOTE — PHYSICAL EXAM
[Alert] : alert [Well Nourished] : well nourished [No Acute Distress] : no acute distress [Well Developed] : well developed [Normal Sclera/Conjunctiva] : normal sclera/conjunctiva [No Proptosis] : no proptosis [Normal Hearing] : hearing was normal [No LAD] : no lymphadenopathy [Thyroid Not Enlarged] : the thyroid was not enlarged [No Thyroid Nodules] : no palpable thyroid nodules [No Respiratory Distress] : no respiratory distress [No Accessory Muscle Use] : no accessory muscle use [Normal Rate and Effort] : normal respiratory rate and effort [Clear to Auscultation] : lungs were clear to auscultation bilaterally [Normal S1, S2] : normal S1 and S2 [Normal Rate] : heart rate was normal [Regular Rhythm] : with a regular rhythm [No Edema] : no peripheral edema [No Nipple Discharge] : no nipple discharge [Not Tender] : non-tender [Soft] : abdomen soft [No Stigmata of Cushings Syndrome] : no stigmata of Cushings Syndrome [Normal Gait] : normal gait [No Rash] : no rash [Acanthosis Nigricans] : no acanthosis nigricans [No Tremors] : no tremors [Oriented x3] : oriented to person, place, and time [Normal Affect] : the affect was normal [Normal Insight/Judgement] : insight and judgment were intact [Normal Mood] : the mood was normal [de-identified] : behind right nipple movable lump, non-tender

## 2023-01-30 ENCOUNTER — APPOINTMENT (OUTPATIENT)
Dept: ENDOCRINOLOGY | Facility: CLINIC | Age: 38
End: 2023-01-30

## 2023-05-01 ENCOUNTER — APPOINTMENT (OUTPATIENT)
Dept: ENDOCRINOLOGY | Facility: CLINIC | Age: 38
End: 2023-05-01
Payer: COMMERCIAL

## 2023-05-01 VITALS
SYSTOLIC BLOOD PRESSURE: 130 MMHG | DIASTOLIC BLOOD PRESSURE: 80 MMHG | HEART RATE: 85 BPM | HEIGHT: 70 IN | BODY MASS INDEX: 22.48 KG/M2 | OXYGEN SATURATION: 98 % | WEIGHT: 157 LBS

## 2023-05-01 LAB — GLUCOSE BLDC GLUCOMTR-MCNC: 235

## 2023-05-01 PROCEDURE — 99214 OFFICE O/P EST MOD 30 MIN: CPT | Mod: 25

## 2023-05-01 PROCEDURE — 82962 GLUCOSE BLOOD TEST: CPT

## 2023-05-01 NOTE — ASSESSMENT
[Retinopathy Screening] : Patient was referred to ophthalmology for retinopathy screening [FreeTextEntry1] : DM type 1, uncontrolled. Likely on excess basal insulin and inadequate prandial insulin dosing.\par Strongly advised using a dexcom sensor\par update labs and eye exam \par Glucose Sensor Necessity: This patient with diabetes performs glucose checks per day utilizing a continuous blood glucose monitor. The patient is treated with insulin via 3 or more injections daily. This patient requires frequent adjustments to their insulin treatment on the basis of therapeutic continuous glucose monitoring results. In addition, the patient has been to our office for an evaluation of their diabetes control within the past 6 months.\par \par

## 2023-05-01 NOTE — HISTORY OF PRESENT ILLNESS
[FreeTextEntry1] : C/o right breast lump under nipple. He reports ED. \par \par Quality: Type 1 DM \par Severity: moderate \par Duration: since the age of 7 \par Onset: polyuria, polydipsia \par Modifying Factors: Better with insulin \par Associated Symptoms: retinopathy, neuropathy \par \par SMBG: Freestyle Kelsi - stopped using due to falling off, does not check blood sugars regularly, denies low blood sugars\par In office 196\par Endorses symptoms of hypoglycemia \par lisinopril caused lightheadedness and stopped\par \par Current drug regimen\par Humalog IC ratio 15, ISF 30\par Lantus 35 units at HS\par \par Eye exam: has not gone in many years- +vision changes- states he hasn’t been in 5 years\par Foot exam: +neuropathy, cuts own toe nails\par Kidney disease: denies\par Heart disease: denies \par \par Weight: stable \par Diet: does not eat whole wheat\par small bfast, small lunch\par +gatorade\par Exercise: HVAC for work \par Smoking +vapes, smokes marijuana

## 2023-06-07 ENCOUNTER — APPOINTMENT (OUTPATIENT)
Dept: ENDOCRINOLOGY | Facility: CLINIC | Age: 38
End: 2023-06-07
Payer: COMMERCIAL

## 2023-06-07 DIAGNOSIS — E10.65 TYPE 1 DIABETES MELLITUS WITH HYPERGLYCEMIA: ICD-10-CM

## 2023-06-07 PROCEDURE — G0108 DIAB MANAGE TRN  PER INDIV: CPT

## 2023-06-07 RX ORDER — GLUCAGON INJECTION, SOLUTION 1 MG/.2ML
1 INJECTION, SOLUTION SUBCUTANEOUS
Qty: 1 | Refills: 3 | Status: ACTIVE | COMMUNITY
Start: 2023-06-07 | End: 1900-01-01

## 2023-06-08 ENCOUNTER — NON-APPOINTMENT (OUTPATIENT)
Age: 38
End: 2023-06-08

## 2023-07-14 ENCOUNTER — TRANSCRIPTION ENCOUNTER (OUTPATIENT)
Age: 38
End: 2023-07-14

## 2023-08-09 ENCOUNTER — APPOINTMENT (OUTPATIENT)
Dept: ENDOCRINOLOGY | Facility: CLINIC | Age: 38
End: 2023-08-09
Payer: COMMERCIAL

## 2023-08-09 PROCEDURE — ZZZZZ: CPT

## 2023-08-16 ENCOUNTER — APPOINTMENT (OUTPATIENT)
Dept: ENDOCRINOLOGY | Facility: CLINIC | Age: 38
End: 2023-08-16
Payer: COMMERCIAL

## 2023-08-16 PROCEDURE — P0004: CPT

## 2023-08-30 ENCOUNTER — APPOINTMENT (OUTPATIENT)
Dept: ENDOCRINOLOGY | Facility: CLINIC | Age: 38
End: 2023-08-30
Payer: COMMERCIAL

## 2023-08-30 PROCEDURE — G0108 DIAB MANAGE TRN  PER INDIV: CPT

## 2023-09-08 LAB
HBA1C MFR BLD HPLC: 9.4
LDLC SERPL DIRECT ASSAY-MCNC: 106
MICROALBUMIN/CREAT 24H UR-RTO: 791
TSH SERPL-ACNC: 3.66

## 2023-09-11 ENCOUNTER — APPOINTMENT (OUTPATIENT)
Dept: ENDOCRINOLOGY | Facility: CLINIC | Age: 38
End: 2023-09-11
Payer: COMMERCIAL

## 2023-09-11 VITALS
DIASTOLIC BLOOD PRESSURE: 80 MMHG | WEIGHT: 159 LBS | OXYGEN SATURATION: 99 % | HEIGHT: 70 IN | HEART RATE: 84 BPM | BODY MASS INDEX: 22.76 KG/M2 | SYSTOLIC BLOOD PRESSURE: 122 MMHG

## 2023-09-11 DIAGNOSIS — N52.9 MALE ERECTILE DYSFUNCTION, UNSPECIFIED: ICD-10-CM

## 2023-09-11 DIAGNOSIS — I10 ESSENTIAL (PRIMARY) HYPERTENSION: ICD-10-CM

## 2023-09-11 LAB — GLUCOSE BLDC GLUCOMTR-MCNC: 199

## 2023-09-11 PROCEDURE — 99214 OFFICE O/P EST MOD 30 MIN: CPT | Mod: 25

## 2023-09-11 PROCEDURE — 82962 GLUCOSE BLOOD TEST: CPT

## 2023-09-11 PROCEDURE — 95251 CONT GLUC MNTR ANALYSIS I&R: CPT

## 2023-09-11 RX ORDER — LISINOPRIL 10 MG/1
10 TABLET ORAL
Qty: 90 | Refills: 0 | Status: DISCONTINUED | COMMUNITY
Start: 2022-10-28 | End: 2023-09-11

## 2023-12-07 LAB
HBA1C MFR BLD HPLC: 7.7
LDLC SERPL DIRECT ASSAY-MCNC: 80
MICROALBUMIN/CREAT 24H UR-RTO: 255

## 2023-12-08 ENCOUNTER — APPOINTMENT (OUTPATIENT)
Dept: ENDOCRINOLOGY | Facility: CLINIC | Age: 38
End: 2023-12-08

## 2024-03-20 ENCOUNTER — APPOINTMENT (OUTPATIENT)
Dept: ENDOCRINOLOGY | Facility: CLINIC | Age: 39
End: 2024-03-20
Payer: COMMERCIAL

## 2024-03-20 VITALS
HEART RATE: 80 BPM | DIASTOLIC BLOOD PRESSURE: 80 MMHG | WEIGHT: 158 LBS | HEIGHT: 70 IN | BODY MASS INDEX: 22.62 KG/M2 | OXYGEN SATURATION: 98 % | SYSTOLIC BLOOD PRESSURE: 125 MMHG

## 2024-03-20 LAB — GLUCOSE BLDC GLUCOMTR-MCNC: 170

## 2024-03-20 PROCEDURE — 99214 OFFICE O/P EST MOD 30 MIN: CPT

## 2024-03-20 PROCEDURE — G2211 COMPLEX E/M VISIT ADD ON: CPT | Mod: NC,1L

## 2024-03-20 PROCEDURE — 95251 CONT GLUC MNTR ANALYSIS I&R: CPT

## 2024-03-20 RX ORDER — INSULIN ASPART 100 [IU]/ML
100 INJECTION, SOLUTION INTRAVENOUS; SUBCUTANEOUS
Qty: 9 | Refills: 1 | Status: ACTIVE | COMMUNITY
Start: 2023-08-09 | End: 1900-01-01

## 2024-03-20 RX ORDER — SILDENAFIL 100 MG/1
100 TABLET, FILM COATED ORAL
Qty: 8 | Refills: 3 | Status: ACTIVE | COMMUNITY
Start: 2024-03-20 | End: 1900-01-01

## 2024-03-20 NOTE — ASSESSMENT
[Retinopathy Screening] : Patient was referred to ophthalmology for retinopathy screening [FreeTextEntry1] : DM type 1 improving control on pump therapy Likely needs an ace or ARB rx sildenafil for ED update labs and eye exam

## 2024-03-20 NOTE — HISTORY OF PRESENT ILLNESS
[FreeTextEntry1] :   Quality: Type 1 DM  Severity: moderate  Duration: since the age of 7  Onset: polyuria, polydipsia  Modifying Factors: Better with insulin  Associated Symptoms: retinopathy, neuropathy   SMBG: Freestyle Kelsi - stopped using due to falling off, does not check blood sugars regularly, denies low blood sugars In office 196 Endorses symptoms of hypoglycemia  lisinopril caused lightheadedness and stopped  Prior drug regimen Humalog IC ratio 15, ISF 30 Lantus 35 units at HS  ON t-slim Eye exam: has not gone in many years- +vision changes- states he hasn't been in 5 years Foot exam: +neuropathy, cuts own toe nails Kidney disease: denies Heart disease: denies   Weight: stable  Diet: does not eat whole wheat small bfast, small lunch +gatorade Exercise: HVAC for work  Smoking +vapes, smokes marijuana  [Continuous Glucose Monitoring] : Continuous Glucose Monitoring: Yes [Dexcom] : Dexcom [FreeTextEntry2] : 63 [FreeTextEntry3] : 34 [FreeTextEntry4] : 3 [de-identified] : 7.3 [FreeTextEntry5] : 167 [FreeTextEntry6] : 44

## 2024-03-25 RX ORDER — LOSARTAN POTASSIUM 25 MG/1
25 TABLET, FILM COATED ORAL
Qty: 90 | Refills: 1 | Status: ACTIVE | COMMUNITY
Start: 2024-03-25 | End: 1900-01-01

## 2024-05-06 ENCOUNTER — APPOINTMENT (OUTPATIENT)
Dept: OPHTHALMOLOGY | Facility: CLINIC | Age: 39
End: 2024-05-06
Payer: COMMERCIAL

## 2024-05-06 ENCOUNTER — NON-APPOINTMENT (OUTPATIENT)
Age: 39
End: 2024-05-06

## 2024-05-06 PROCEDURE — 92004 COMPRE OPH EXAM NEW PT 1/>: CPT

## 2024-05-06 PROCEDURE — 92134 CPTRZ OPH DX IMG PST SGM RTA: CPT

## 2024-06-25 ENCOUNTER — APPOINTMENT (OUTPATIENT)
Dept: ENDOCRINOLOGY | Facility: CLINIC | Age: 39
End: 2024-06-25
Payer: COMMERCIAL

## 2024-06-25 DIAGNOSIS — E10.9 TYPE 1 DIABETES MELLITUS W/OUT COMPLICATIONS: ICD-10-CM

## 2024-06-25 PROCEDURE — G0108 DIAB MANAGE TRN  PER INDIV: CPT

## 2024-10-16 ENCOUNTER — APPOINTMENT (OUTPATIENT)
Dept: ENDOCRINOLOGY | Facility: CLINIC | Age: 39
End: 2024-10-16
Payer: COMMERCIAL

## 2024-10-16 ENCOUNTER — RESULT CHARGE (OUTPATIENT)
Age: 39
End: 2024-10-16

## 2024-10-16 VITALS
BODY MASS INDEX: 22.9 KG/M2 | OXYGEN SATURATION: 99 % | WEIGHT: 160 LBS | SYSTOLIC BLOOD PRESSURE: 130 MMHG | DIASTOLIC BLOOD PRESSURE: 70 MMHG | HEIGHT: 70 IN | HEART RATE: 111 BPM

## 2024-10-16 DIAGNOSIS — E10.9 TYPE 1 DIABETES MELLITUS W/OUT COMPLICATIONS: ICD-10-CM

## 2024-10-16 DIAGNOSIS — N52.9 MALE ERECTILE DYSFUNCTION, UNSPECIFIED: ICD-10-CM

## 2024-10-16 LAB — GLUCOSE BLDC GLUCOMTR-MCNC: 256

## 2024-10-16 PROCEDURE — 82962 GLUCOSE BLOOD TEST: CPT

## 2024-10-16 PROCEDURE — 99214 OFFICE O/P EST MOD 30 MIN: CPT

## 2024-10-16 PROCEDURE — G2211 COMPLEX E/M VISIT ADD ON: CPT | Mod: NC

## 2024-10-16 PROCEDURE — 95251 CONT GLUC MNTR ANALYSIS I&R: CPT

## 2024-10-17 LAB
ALBUMIN SERPL ELPH-MCNC: 4.6 G/DL
ALP BLD-CCNC: 154 U/L
ALT SERPL-CCNC: 18 U/L
ANION GAP SERPL CALC-SCNC: 12 MMOL/L
AST SERPL-CCNC: 18 U/L
BILIRUB SERPL-MCNC: 0.7 MG/DL
BUN SERPL-MCNC: 16 MG/DL
CALCIUM SERPL-MCNC: 10.2 MG/DL
CHLORIDE SERPL-SCNC: 102 MMOL/L
CHOLEST SERPL-MCNC: 165 MG/DL
CO2 SERPL-SCNC: 26 MMOL/L
CREAT SERPL-MCNC: 0.86 MG/DL
CREAT SPEC-SCNC: 80 MG/DL
EGFR: 113 ML/MIN/1.73M2
ESTIMATED AVERAGE GLUCOSE: 169 MG/DL
GLUCOSE SERPL-MCNC: 273 MG/DL
HBA1C MFR BLD HPLC: 7.5 %
HCT VFR BLD CALC: 51.7 %
HDLC SERPL-MCNC: 47 MG/DL
HGB BLD-MCNC: 17.1 G/DL
LDLC SERPL CALC-MCNC: 95 MG/DL
MCHC RBC-ENTMCNC: 30.6 PG
MCHC RBC-ENTMCNC: 33.1 GM/DL
MCV RBC AUTO: 92.7 FL
MICROALBUMIN 24H UR DL<=1MG/L-MCNC: 83.4 MG/DL
MICROALBUMIN/CREAT 24H UR-RTO: 1046 MG/G
NONHDLC SERPL-MCNC: 118 MG/DL
PLATELET # BLD AUTO: 273 K/UL
POTASSIUM SERPL-SCNC: 4.4 MMOL/L
PROT SERPL-MCNC: 7.3 G/DL
RBC # BLD: 5.58 M/UL
RBC # FLD: 13.3 %
SODIUM SERPL-SCNC: 140 MMOL/L
TRIGL SERPL-MCNC: 127 MG/DL
TSH SERPL-ACNC: 3.36 UIU/ML
WBC # FLD AUTO: 8.46 K/UL

## 2024-10-17 RX ORDER — INSULIN LISPRO 100 [IU]/ML
100 INJECTION, SOLUTION INTRAVENOUS; SUBCUTANEOUS
Qty: 9 | Refills: 1 | Status: ACTIVE | COMMUNITY
Start: 2024-10-16 | End: 1900-01-01

## 2024-12-16 ENCOUNTER — NON-APPOINTMENT (OUTPATIENT)
Age: 39
End: 2024-12-16

## 2025-01-14 ENCOUNTER — APPOINTMENT (OUTPATIENT)
Dept: ENDOCRINOLOGY | Facility: CLINIC | Age: 40
End: 2025-01-14

## 2025-03-06 ENCOUNTER — APPOINTMENT (OUTPATIENT)
Dept: OPHTHALMOLOGY | Facility: CLINIC | Age: 40
End: 2025-03-06
Payer: COMMERCIAL

## 2025-03-06 ENCOUNTER — NON-APPOINTMENT (OUTPATIENT)
Age: 40
End: 2025-03-06

## 2025-03-06 PROCEDURE — 92134 CPTRZ OPH DX IMG PST SGM RTA: CPT

## 2025-03-06 PROCEDURE — 92014 COMPRE OPH EXAM EST PT 1/>: CPT

## 2025-05-29 ENCOUNTER — RESULT CHARGE (OUTPATIENT)
Age: 40
End: 2025-05-29

## 2025-05-29 ENCOUNTER — APPOINTMENT (OUTPATIENT)
Dept: ENDOCRINOLOGY | Facility: CLINIC | Age: 40
End: 2025-05-29
Payer: COMMERCIAL

## 2025-05-29 VITALS
DIASTOLIC BLOOD PRESSURE: 70 MMHG | HEIGHT: 70 IN | OXYGEN SATURATION: 99 % | SYSTOLIC BLOOD PRESSURE: 124 MMHG | WEIGHT: 160 LBS | HEART RATE: 83 BPM | BODY MASS INDEX: 22.9 KG/M2

## 2025-05-29 DIAGNOSIS — E10.65 TYPE 1 DIABETES MELLITUS WITH HYPERGLYCEMIA: ICD-10-CM

## 2025-05-29 LAB — GLUCOSE BLDC GLUCOMTR-MCNC: 122

## 2025-05-29 PROCEDURE — 99214 OFFICE O/P EST MOD 30 MIN: CPT

## 2025-05-29 PROCEDURE — G2211 COMPLEX E/M VISIT ADD ON: CPT | Mod: NC

## 2025-08-25 ENCOUNTER — APPOINTMENT (OUTPATIENT)
Dept: ENDOCRINOLOGY | Facility: CLINIC | Age: 40
End: 2025-08-25
Payer: COMMERCIAL

## 2025-08-25 VITALS
BODY MASS INDEX: 23.62 KG/M2 | HEIGHT: 70 IN | SYSTOLIC BLOOD PRESSURE: 125 MMHG | OXYGEN SATURATION: 98 % | WEIGHT: 165 LBS | DIASTOLIC BLOOD PRESSURE: 80 MMHG | HEART RATE: 83 BPM

## 2025-08-25 DIAGNOSIS — E10.65 TYPE 1 DIABETES MELLITUS WITH HYPERGLYCEMIA: ICD-10-CM

## 2025-08-25 DIAGNOSIS — I10 ESSENTIAL (PRIMARY) HYPERTENSION: ICD-10-CM

## 2025-08-25 LAB — GLUCOSE BLDC GLUCOMTR-MCNC: 180

## 2025-08-25 PROCEDURE — 99214 OFFICE O/P EST MOD 30 MIN: CPT

## 2025-08-25 PROCEDURE — 95251 CONT GLUC MNTR ANALYSIS I&R: CPT

## 2025-08-25 PROCEDURE — 36415 COLL VENOUS BLD VENIPUNCTURE: CPT

## 2025-08-25 PROCEDURE — 82962 GLUCOSE BLOOD TEST: CPT

## 2025-08-26 LAB
ALBUMIN SERPL ELPH-MCNC: 4.4 G/DL
ALBUMIN, RANDOM URINE: 76.4 MG/DL
ALP BLD-CCNC: 122 U/L
ALT SERPL-CCNC: 22 U/L
ANION GAP SERPL CALC-SCNC: 13 MMOL/L
AST SERPL-CCNC: 23 U/L
BASOPHILS # BLD AUTO: 0.08 K/UL
BASOPHILS NFR BLD AUTO: 0.8 %
BILIRUB SERPL-MCNC: 0.4 MG/DL
BUN SERPL-MCNC: 13 MG/DL
CALCIUM SERPL-MCNC: 9.9 MG/DL
CHLORIDE SERPL-SCNC: 104 MMOL/L
CHOLEST SERPL-MCNC: 153 MG/DL
CO2 SERPL-SCNC: 23 MMOL/L
CREAT SERPL-MCNC: 0.69 MG/DL
CREAT SPEC-SCNC: 112 MG/DL
EGFRCR SERPLBLD CKD-EPI 2021: 121 ML/MIN/1.73M2
EOSINOPHIL # BLD AUTO: 0.28 K/UL
EOSINOPHIL NFR BLD AUTO: 2.7 %
ESTIMATED AVERAGE GLUCOSE: 174 MG/DL
GLUCOSE SERPL-MCNC: 187 MG/DL
HBA1C MFR BLD HPLC: 7.7 %
HCT VFR BLD CALC: 48.8 %
HDLC SERPL-MCNC: 58 MG/DL
HGB BLD-MCNC: 16.3 G/DL
IMM GRANULOCYTES NFR BLD AUTO: 0.2 %
LDLC SERPL-MCNC: 84 MG/DL
LYMPHOCYTES # BLD AUTO: 1.52 K/UL
LYMPHOCYTES NFR BLD AUTO: 14.5 %
MAN DIFF?: NORMAL
MCHC RBC-ENTMCNC: 30.8 PG
MCHC RBC-ENTMCNC: 33.4 G/DL
MCV RBC AUTO: 92.2 FL
MICROALBUMIN/CREAT 24H UR-RTO: 683 MG/G
MONOCYTES # BLD AUTO: 0.52 K/UL
MONOCYTES NFR BLD AUTO: 5 %
NEUTROPHILS # BLD AUTO: 8.07 K/UL
NEUTROPHILS NFR BLD AUTO: 76.8 %
NONHDLC SERPL-MCNC: 95 MG/DL
PLATELET # BLD AUTO: 249 K/UL
POTASSIUM SERPL-SCNC: 4.2 MMOL/L
PROT SERPL-MCNC: 6.8 G/DL
RBC # BLD: 5.29 M/UL
RBC # FLD: 13.2 %
SODIUM SERPL-SCNC: 140 MMOL/L
T4 FREE SERPL-MCNC: 1.1 NG/DL
TRIGL SERPL-MCNC: 54 MG/DL
TSH SERPL-ACNC: 2.87 UIU/ML
WBC # FLD AUTO: 10.49 K/UL